# Patient Record
Sex: MALE | Race: WHITE | Employment: UNEMPLOYED | URBAN - METROPOLITAN AREA
[De-identification: names, ages, dates, MRNs, and addresses within clinical notes are randomized per-mention and may not be internally consistent; named-entity substitution may affect disease eponyms.]

---
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
## 2024-01-23 PROBLEM — Q79.8 POLAND'S SYNDROME: Status: ACTIVE | Noted: 2024-01-23

## 2024-02-06 ENCOUNTER — APPOINTMENT (OUTPATIENT)
Dept: CARDIOLOGY | Facility: CLINIC | Age: 19
End: 2024-02-06
Payer: COMMERCIAL

## 2024-03-28 ENCOUNTER — OFFICE VISIT (OUTPATIENT)
Dept: CARDIOLOGY | Facility: CLINIC | Age: 19
End: 2024-03-28
Payer: COMMERCIAL

## 2024-03-28 VITALS
DIASTOLIC BLOOD PRESSURE: 72 MMHG | HEART RATE: 52 BPM | HEIGHT: 70 IN | BODY MASS INDEX: 22.59 KG/M2 | SYSTOLIC BLOOD PRESSURE: 120 MMHG | WEIGHT: 157.8 LBS

## 2024-03-28 DIAGNOSIS — Z78.9 NEVER SMOKED TOBACCO: ICD-10-CM

## 2024-03-28 DIAGNOSIS — R94.31 ABNORMAL EKG: ICD-10-CM

## 2024-03-28 DIAGNOSIS — R00.2 PALPITATIONS: ICD-10-CM

## 2024-03-28 DIAGNOSIS — R07.89 ATYPICAL CHEST PAIN: Primary | ICD-10-CM

## 2024-03-28 DIAGNOSIS — Q79.8 POLAND'S SYNDROME: ICD-10-CM

## 2024-03-28 DIAGNOSIS — R06.09 DYSPNEA ON EXERTION: ICD-10-CM

## 2024-03-28 PROCEDURE — 1036F TOBACCO NON-USER: CPT | Performed by: INTERNAL MEDICINE

## 2024-03-28 PROCEDURE — 99213 OFFICE O/P EST LOW 20 MIN: CPT | Performed by: INTERNAL MEDICINE

## 2024-03-28 PROCEDURE — 3008F BODY MASS INDEX DOCD: CPT | Performed by: INTERNAL MEDICINE

## 2024-03-28 RX ORDER — SUCRALFATE 1 G/10ML
1 SUSPENSION ORAL 3 TIMES DAILY
COMMUNITY
Start: 2024-02-21

## 2024-03-28 RX ORDER — PANTOPRAZOLE SODIUM 40 MG/1
40 TABLET, DELAYED RELEASE ORAL
COMMUNITY
Start: 2024-02-21

## 2024-03-28 ASSESSMENT — ENCOUNTER SYMPTOMS: SHORTNESS OF BREATH: 1

## 2024-03-28 NOTE — PROGRESS NOTES
"Subjective   Cole Gong is a 18 y.o. male       Chief Complaint    Follow-up          HPI     Patient is here for follow-up continue management for history of previous evaluation for atypical chest pain shortness of breath and palpitation.  He underwent extensive workup which was completely benign.  The stress test was good his echocardiogram was normal and Holter failed to demonstrate any arrhythmia.  He reports has been having symptoms of gastroesophageal reflux disease   assessment     1. Atypical chest pain recent stress test showed good exercise tolerance and was negative  2. Symptoms of shortness of breath. Recent echocardiogram was normal  3. Symptoms of palpitation Holter monitor failed to demonstrate significant arrhythmia  4. Grossly abnormal EKG suggestive of left ventricular hypertrophy and prominent Q waves in the inferior lead reflecting septal activation and the fact he is fairly thin and reflecting vertical heart rather than true LVH  5. 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 Anxiety disorder  7.  Gastroesophageal flux disease     Plan     1. I reviewed with him the results of his diagnostic testing I reassured him  2. Recommended observant approach  3. I suggested follow-up in a few years for recheck  4.  Patient is scheduled to see gastroenterology        Surgical History  Problems    · Denied: History of Colonoscopy   · History of Ear pressure equalization tube insertion bilateral   · History of Finger surgical procedure  Review of Systems   Cardiovascular:  Positive for chest pain.   Respiratory:  Positive for shortness of breath.    All other systems reviewed and are negative.           Vitals:    03/28/24 1501   BP: 120/72   BP Location: Right arm   Patient Position: Sitting   Pulse: 52   Weight: 71.6 kg (157 lb 12.8 oz)   Height: 1.778 m (5' 10\")        Objective   Physical Exam  Constitutional:       " Appearance: Normal appearance.   HENT:      Nose: Nose normal.   Neck:      Vascular: No carotid bruit.   Cardiovascular:      Rate and Rhythm: Normal rate.      Pulses: Normal pulses.      Heart sounds: Normal heart sounds.   Pulmonary:      Effort: Pulmonary effort is normal.   Abdominal:      General: Bowel sounds are normal.      Palpations: Abdomen is soft.   Musculoskeletal:         General: Normal range of motion.      Cervical back: Normal range of motion.      Right lower leg: No edema.      Left lower leg: No edema.   Skin:     General: Skin is warm and dry.   Neurological:      General: No focal deficit present.      Mental Status: He is alert.   Psychiatric:         Mood and Affect: Mood normal.         Behavior: Behavior normal.         Thought Content: Thought content normal.         Judgment: Judgment normal.         Allergies  Patient has no known allergies.     Current Medications    Current Outpatient Medications:     pantoprazole (ProtoNix) 40 mg EC tablet, Take 1 tablet (40 mg) by mouth., Disp: , Rfl:     sucralfate (Carafate) 100 mg/mL suspension, Take 10 mL (1 g) by mouth 3 times a day., Disp: , Rfl:                      Assessment/Plan   1. Atypical chest pain        2. Dyspnea on exertion        3. Palpitations        4. Abnormal EKG        5. [place]'s syndrome        6. BMI 22.0-22.9, adult        7. Never smoked tobacco                 Scribe Attestation  By signing my name below, I, Yesenia Khan LPN   attest that this documentation has been prepared under the direction and in the presence of Jace Willoughby MD.     Provider Attestation - Scribe documentation    All medical record entries made by the Scribe were at my direction and personally dictated by me. I have reviewed the chart and agree that the record accurately reflects my personal performance of the history, physical exam, discussion and plan.

## 2024-03-28 NOTE — LETTER
March 28, 2024     Davian Pelletier DO  [address]  [place] [address]    Patient: Cole Gong   YOB: 2005   Date of Visit: 3/28/2024       Dear Dr. Davian Pelletier DO:    Thank you for referring Cole Gong to me for evaluation. Below are my notes for this consultation.  If you have questions, please do not hesitate to call me. I look forward to following your patient along with you.       Sincerely,     Jace Willoughby MD      CC: No Recipients  ______________________________________________________________________________________    Subjective   Cole Gong is a 18 y.o. male       Chief Complaint    Follow-up          HPI     Patient is here for follow-up continue management for history of previous evaluation for atypical chest pain shortness of breath and palpitation.  He underwent extensive workup which was completely benign.  The stress test was good his echocardiogram was normal and Holter failed to demonstrate any arrhythmia.  He reports has been having symptoms of gastroesophageal reflux disease   assessment     1. Atypical chest pain recent stress test showed good exercise tolerance and was negative  2. Symptoms of shortness of breath. Recent echocardiogram was normal  3. Symptoms of palpitation Holter monitor failed to demonstrate significant arrhythmia  4. Grossly abnormal EKG suggestive of left ventricular hypertrophy and prominent Q waves in the inferior lead reflecting septal activation and the fact he is fairly thin and reflecting vertical heart rather than true LVH  5. History of Jackson syndrome which carries increased risk of cardiac anomaly in addition to chest wall abnormality  6. Anxiety disorder  7.  Gastroesophageal flux disease     Plan     1. I reviewed with him the results of his diagnostic testing I reassured him  2. Recommended observant approach  3. I suggested follow-up in a few years for recheck  4.  Patient is scheduled to see  "gastroenterology        Surgical History  Problems    · Denied: History of Colonoscopy   · History of Ear pressure equalization tube insertion bilateral   · History of Finger surgical procedure  Review of Systems   Cardiovascular:  Positive for chest pain.   Respiratory:  Positive for shortness of breath.    All other systems reviewed and are negative.           Vitals:    03/28/24 1501   BP: 120/72   BP Location: Right arm   Patient Position: Sitting   Pulse: 52   Weight: 71.6 kg (157 lb 12.8 oz)   Height: 1.778 m (5' 10\")        Objective   Physical Exam  Constitutional:       Appearance: Normal appearance.   HENT:      Nose: Nose normal.   Neck:      Vascular: No carotid bruit.   Cardiovascular:      Rate and Rhythm: Normal rate.      Pulses: Normal pulses.      Heart sounds: Normal heart sounds.   Pulmonary:      Effort: Pulmonary effort is normal.   Abdominal:      General: Bowel sounds are normal.      Palpations: Abdomen is soft.   Musculoskeletal:         General: Normal range of motion.      Cervical back: Normal range of motion.      Right lower leg: No edema.      Left lower leg: No edema.   Skin:     General: Skin is warm and dry.   Neurological:      General: No focal deficit present.      Mental Status: He is alert.   Psychiatric:         Mood and Affect: Mood normal.         Behavior: Behavior normal.         Thought Content: Thought content normal.         Judgment: Judgment normal.         Allergies  Patient has no known allergies.     Current Medications    Current Outpatient Medications:   •  pantoprazole (ProtoNix) 40 mg EC tablet, Take 1 tablet (40 mg) by mouth., Disp: , Rfl:   •  sucralfate (Carafate) 100 mg/mL suspension, Take 10 mL (1 g) by mouth 3 times a day., Disp: , Rfl:                      Assessment/Plan   1. Atypical chest pain        2. Dyspnea on exertion        3. Palpitations        4. Abnormal EKG        5. [place]'s syndrome        6. BMI 22.0-22.9, adult        7. Never smoked " tobacco                 Scribe Attestation  By signing my name below, I, Yesenia Khan LPN   attest that this documentation has been prepared under the direction and in the presence of Jace Willoughby MD.     Provider Attestation - Scribe documentation    All medical record entries made by the Scribe were at my direction and personally dictated by me. I have reviewed the chart and agree that the record accurately reflects my personal performance of the history, physical exam, discussion and plan.

## 2024-03-28 NOTE — PATIENT INSTRUCTIONS
No new care gaps identified.  [facility] Embedded Care Gaps. Reference number: [id number]. 12/05/2022   2:32:53 PM CST   Please bring all medicines, vitamins, and herbal supplements with you when you come to the office.    Prescriptions will not be filled unless you are compliant with your follow up appointments or have a follow up appointment scheduled as per instruction of your physician. Refills should be requested at the time of your visit.     Patient to follow with cardiology as needed